# Patient Record
Sex: MALE | Race: WHITE | NOT HISPANIC OR LATINO | Employment: FULL TIME | URBAN - METROPOLITAN AREA
[De-identification: names, ages, dates, MRNs, and addresses within clinical notes are randomized per-mention and may not be internally consistent; named-entity substitution may affect disease eponyms.]

---

## 2024-04-24 ENCOUNTER — HOSPITAL ENCOUNTER (EMERGENCY)
Facility: HOSPITAL | Age: 39
Discharge: HOME | End: 2024-04-24
Attending: EMERGENCY MEDICINE
Payer: OTHER GOVERNMENT

## 2024-04-24 VITALS
DIASTOLIC BLOOD PRESSURE: 75 MMHG | OXYGEN SATURATION: 98 % | RESPIRATION RATE: 15 BRPM | TEMPERATURE: 98.1 F | HEIGHT: 64 IN | HEART RATE: 72 BPM | BODY MASS INDEX: 42.27 KG/M2 | SYSTOLIC BLOOD PRESSURE: 123 MMHG | WEIGHT: 247.58 LBS

## 2024-04-24 DIAGNOSIS — H16.133 PHOTOKERATITIS OF BOTH EYES: Primary | ICD-10-CM

## 2024-04-24 PROCEDURE — 99283 EMERGENCY DEPT VISIT LOW MDM: CPT

## 2024-04-24 RX ORDER — HYDROCODONE BITARTRATE AND ACETAMINOPHEN 7.5; 325 MG/1; MG/1
1 TABLET ORAL ONCE
Status: COMPLETED | OUTPATIENT
Start: 2024-04-24 | End: 2024-04-24

## 2024-04-24 RX ORDER — PROPARACAINE HYDROCHLORIDE 5 MG/ML
2 SOLUTION/ DROPS OPHTHALMIC ONCE
Status: COMPLETED | OUTPATIENT
Start: 2024-04-24 | End: 2024-04-24

## 2024-04-24 RX ORDER — ERYTHROMYCIN 5 MG/G
1 OINTMENT OPHTHALMIC ONCE
Status: COMPLETED | OUTPATIENT
Start: 2024-04-24 | End: 2024-04-24

## 2024-04-24 RX ORDER — ERYTHROMYCIN 5 MG/G
OINTMENT OPHTHALMIC
Qty: 3.5 G | Refills: 0 | Status: SHIPPED | OUTPATIENT
Start: 2024-04-24 | End: 2024-04-29

## 2024-04-24 RX ORDER — HYDROCODONE BITARTRATE AND ACETAMINOPHEN 7.5; 325 MG/1; MG/1
1 TABLET ORAL EVERY 6 HOURS PRN
Qty: 15 TABLET | Refills: 0 | Status: SHIPPED | OUTPATIENT
Start: 2024-04-24

## 2024-04-24 RX ORDER — PROPARACAINE HYDROCHLORIDE 5 MG/ML
SOLUTION/ DROPS OPHTHALMIC
Status: COMPLETED
Start: 2024-04-24 | End: 2024-04-24

## 2024-04-24 RX ADMIN — FLUORESCEIN SODIUM 2 STRIP: 1 STRIP OPHTHALMIC at 05:06

## 2024-04-24 RX ADMIN — PROPARACAINE HYDROCHLORIDE 2 DROP: 5 SOLUTION/ DROPS OPHTHALMIC at 05:07

## 2024-04-24 RX ADMIN — ERYTHROMYCIN 1 APPLICATION: 5 OINTMENT OPHTHALMIC at 05:06

## 2024-04-24 RX ADMIN — HYDROCODONE BITARTRATE AND ACETAMINOPHEN 1 TABLET: 7.5; 325 TABLET ORAL at 05:07

## 2024-04-24 NOTE — Clinical Note
[facility] EMERGENCY DEPARTMENT  [address] [place] [address]  Phone: [phone]    Chalino Blue was seen and treated in our emergency department on 4/24/2024.  He may return to work on 04/26/2024.         Thank you for choosing [facility].    Graeme Oneil MD

## 2024-04-24 NOTE — ED PROVIDER NOTES
Subjective   History of Present Illness  39-year-old male with bilateral eye pain and irritation onset just prior to arrival.  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  No chronic eye issues or corrective lenses.  Tetanus is up-to-date.      Review of Systems   Eyes:  Positive for photophobia, pain and redness.       No past medical history on file.    No Known Allergies    No past surgical history on file.    No family history on file.    Social History     Socioeconomic History    Marital status:            Objective   Physical Exam  Constitutional:       Appearance: Normal appearance.   HENT:      Head: Normocephalic and atraumatic.   Eyes:      Pupils: Pupils are equal, round, and reactive to light.      Comments: Mild conjunctival injection bilaterally, no foreign body seen with lamp exam, no uptake, pain relief with topical anesthetic.   Skin:     General: Skin is warm and dry.   Neurological:      Mental Status: He is alert.         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    Problems Addressed:  Photokeratitis of both eyes: complicated acute illness or injury    Risk  Prescription drug management.        Final diagnoses:   Photokeratitis of both eyes       ED Disposition  ED Disposition       ED Disposition   Discharge    Condition   Stable    Comment   --               Kel Serrano MD  [facility] [place] [address] [phone]      As needed         Medication List        New Prescriptions      erythromycin 5 MG/GM ophthalmic ointment  Commonly known as: ROMYCIN  Administer  to both eyes Every 4 (Four) Hours While Awake for 5 days.      HYDROcodone-acetaminophen 7.5-325 MG per tablet  Commonly known as: NORCO  Take 1 tablet by mouth Every 6 (Six) Hours As Needed for Moderate Pain.               Where to Get Your Medications        These medications were sent to [id number] - [place], [place] - [address] AT [facility] [facility] [address] [facility] [address] - [phone]  - [phone] FX  [address] [place] [address]      Phone: [phone]   erythromycin 5 MG/GM ophthalmic ointment  HYDROcodone-acetaminophen 7.5-325 MG per tablet            Graeme Oneil MD  04/24/24 0439